# Patient Record
Sex: MALE | Race: WHITE | NOT HISPANIC OR LATINO | Employment: FULL TIME | URBAN - METROPOLITAN AREA
[De-identification: names, ages, dates, MRNs, and addresses within clinical notes are randomized per-mention and may not be internally consistent; named-entity substitution may affect disease eponyms.]

---

## 2017-01-16 ENCOUNTER — NON-PROVIDER VISIT (OUTPATIENT)
Dept: OCCUPATIONAL MEDICINE | Facility: CLINIC | Age: 37
End: 2017-01-16

## 2017-01-16 DIAGNOSIS — Z02.1 PRE-EMPLOYMENT DRUG SCREENING: ICD-10-CM

## 2017-01-16 LAB
AMP AMPHETAMINE: NORMAL
BAR BARBITURATES: NORMAL
BZO BENZODIAZEPINES: NORMAL
COC COCAINE: NORMAL
INT CON NEG: NORMAL
INT CON POS: NORMAL
MDMA ECSTASY: NORMAL
MET METHAMPHETAMINES: NORMAL
MTD METHADONE: NORMAL
OPI OPIATES: NORMAL
OXY OXYCODONE: NORMAL
PCP PHENCYCLIDINE: NORMAL
POC URINE DRUG SCREEN OCDRS: NEGATIVE
THC: NORMAL

## 2017-01-16 PROCEDURE — 80305 DRUG TEST PRSMV DIR OPT OBS: CPT | Performed by: PREVENTIVE MEDICINE

## 2018-04-24 ENCOUNTER — OFFICE VISIT (OUTPATIENT)
Dept: URGENT CARE | Facility: CLINIC | Age: 38
End: 2018-04-24
Payer: COMMERCIAL

## 2018-04-24 VITALS
HEART RATE: 99 BPM | HEIGHT: 71 IN | DIASTOLIC BLOOD PRESSURE: 104 MMHG | TEMPERATURE: 98.3 F | WEIGHT: 214.8 LBS | BODY MASS INDEX: 30.07 KG/M2 | SYSTOLIC BLOOD PRESSURE: 158 MMHG | OXYGEN SATURATION: 95 % | RESPIRATION RATE: 16 BRPM

## 2018-04-24 DIAGNOSIS — J20.9 ACUTE BRONCHITIS, UNSPECIFIED ORGANISM: ICD-10-CM

## 2018-04-24 PROCEDURE — 99204 OFFICE O/P NEW MOD 45 MIN: CPT | Performed by: PHYSICIAN ASSISTANT

## 2018-04-24 RX ORDER — ALBUTEROL SULFATE 90 UG/1
2 AEROSOL, METERED RESPIRATORY (INHALATION) EVERY 6 HOURS PRN
Qty: 8.5 G | Refills: 0 | Status: SHIPPED | OUTPATIENT
Start: 2018-04-24

## 2018-04-24 RX ORDER — METHYLPREDNISOLONE 4 MG/1
TABLET ORAL
Qty: 21 TAB | Refills: 0 | Status: SHIPPED | OUTPATIENT
Start: 2018-04-24 | End: 2018-07-30

## 2018-04-24 RX ORDER — AZITHROMYCIN 250 MG/1
TABLET, FILM COATED ORAL
Qty: 6 TAB | Refills: 0 | Status: SHIPPED | OUTPATIENT
Start: 2018-04-24 | End: 2018-07-30

## 2018-04-24 NOTE — PROGRESS NOTES
"Chief Complaint   Patient presents with   • Cough     cough/ chest congestion X 3 weeks        HISTORY OF PRESENT ILLNESS: Patient is a 38 y.o. male who presents today for 3 weeks of not improving coughing/chest congestion.   Last night he may have had fevers because he felt cold and was having sweats.  He coughs more in the morning and he coughs so hard he almost throws up and brings up some mucus.  Today that mucus was discolored.  No blood.    The cough is otherwise dry.  He is having a near constant runny nose but he attributes this to dust allergies.   He does feel some wheezing/tight chested intermittently.  No chest pain or pain with breathing.   He does smoke 1/2 PPD.      There are no active problems to display for this patient.      Allergies:Patient has no known allergies.    No current [facility]-ordered outpatient prescriptions on file.     No current [facility]-ordered facility-administered medications on file.        History reviewed. No pertinent past medical history.    Social History   Substance Use Topics   • Smoking status: Current Every Day Smoker     Packs/day: 0.50     Types: Cigarettes   • Smokeless tobacco: Never Used   • Alcohol use Not on file       No family status information on file.   History reviewed. No pertinent family history.    ROS:  Review of Systems   Constitutional: SEE HPI  HENT: SEE HPI  Eyes: Negative for blurred vision.   Respiratory: SEE HPI   Cardiovascular: Negative for chest pain, palpitations, orthopnea and leg swelling.   Gastrointestinal: Negative for heartburn, nausea, vomiting and abdominal pain.   All other systems reviewed and are negative.       Exam:  Blood pressure 158/104, pulse 99, temperature 36.8 °C (98.3 °F), resp. rate 16, height 1.803 m (5' 11\"), weight 97.4 kg (214 lb 12.8 oz), SpO2 95 %.  General:  Well nourished, well developed male in NAD  Eyes: PERRLA, EOM within normal limits, no conjunctival injection, no scleral icterus, visual fields and acuity grossly " intact.  Ears: Normal shape and symmetry, no tenderness, no discharge. External canals are without any significant edema or erythema. Tympanic membranes are without any inflammation, no effusion. Gross auditory acuity is intact  Nose: Symmetrical, sinuses without tenderness, no discharge.   Mouth: reasonable hygiene, no erythema exudates or tonsillar enlargement.  Neck: no masses, range of motion within normal limits, no tracheal deviation. No lymphadenopathy  Pulmonary: Normal respiratory effort, end exp wheezes scattered bilaterally without crackles or rhonchi.   Cardiovascular: regular rate and rhythm without murmurs, rubs, or gallops.  Skin: No visible rashes or lesion. Warm, pink, dry.   Extremities: no clubbing, cyanosis, or edema.  Neuro: A&O x 3. Speech normal/clear.  Normal gait.       Assessment/Plan:  1. Acute bronchitis, unspecified organism  MethylPREDNISolone (MEDROL DOSEPAK) 4 MG Tablet Therapy Pack    azithromycin (ZITHROMAX) 250 MG Tab    albuterol 108 (90 Base) MCG/ACT Aero Soln inhalation aerosol         -albuterol HFA, additional rx as above.   -humidifier/steamy showers recommended for lung soothing.  Rest and fluids emphasized.   -smoking cessation emphasized.        Supportive care, differential diagnoses, and indications for immediate follow-up discussed with patient.   Pathogenesis of diagnosis discussed including typical length and natural progression.   Instructed to return to clinic or nearest emergency department for any change in condition, further concerns, or worsening of symptoms.  Patient states understanding of the plan of care and discharge instructions.      Lay Cardoso P.A.-C.

## 2018-07-30 ENCOUNTER — APPOINTMENT (OUTPATIENT)
Dept: RADIOLOGY | Facility: MEDICAL CENTER | Age: 38
End: 2018-07-30
Attending: INTERNAL MEDICINE
Payer: COMMERCIAL

## 2018-07-30 ENCOUNTER — HOSPITAL ENCOUNTER (OUTPATIENT)
Facility: MEDICAL CENTER | Age: 38
End: 2018-07-30
Attending: EMERGENCY MEDICINE | Admitting: INTERNAL MEDICINE
Payer: COMMERCIAL

## 2018-07-30 ENCOUNTER — APPOINTMENT (OUTPATIENT)
Dept: RADIOLOGY | Facility: MEDICAL CENTER | Age: 38
End: 2018-07-30
Attending: EMERGENCY MEDICINE
Payer: COMMERCIAL

## 2018-07-30 ENCOUNTER — PATIENT OUTREACH (OUTPATIENT)
Dept: HEALTH INFORMATION MANAGEMENT | Facility: OTHER | Age: 38
End: 2018-07-30

## 2018-07-30 VITALS
OXYGEN SATURATION: 96 % | BODY MASS INDEX: 30.17 KG/M2 | HEIGHT: 70 IN | HEART RATE: 80 BPM | DIASTOLIC BLOOD PRESSURE: 84 MMHG | WEIGHT: 210.76 LBS | TEMPERATURE: 98.5 F | RESPIRATION RATE: 16 BRPM | SYSTOLIC BLOOD PRESSURE: 153 MMHG

## 2018-07-30 DIAGNOSIS — Z82.49 FAMILY HX OF ISCHEM HEART DIS AND OTH DIS OF THE CIRC SYS: ICD-10-CM

## 2018-07-30 DIAGNOSIS — R07.9 CHEST PAIN AT REST: ICD-10-CM

## 2018-07-30 DIAGNOSIS — Z72.0 TOBACCO ABUSE: ICD-10-CM

## 2018-07-30 PROBLEM — F10.10 ALCOHOL ABUSE: Status: ACTIVE | Noted: 2018-07-30

## 2018-07-30 PROBLEM — R55 SYNCOPE: Status: RESOLVED | Noted: 2018-07-30 | Resolved: 2018-07-30

## 2018-07-30 PROBLEM — R55 SYNCOPE: Status: ACTIVE | Noted: 2018-07-30

## 2018-07-30 PROBLEM — E87.6 HYPOKALEMIA: Status: RESOLVED | Noted: 2018-07-30 | Resolved: 2018-07-30

## 2018-07-30 PROBLEM — E87.6 HYPOKALEMIA: Status: ACTIVE | Noted: 2018-07-30

## 2018-07-30 LAB
ALBUMIN SERPL BCP-MCNC: 4.3 G/DL (ref 3.2–4.9)
ALBUMIN/GLOB SERPL: 1.4 G/DL
ALP SERPL-CCNC: 67 U/L (ref 30–99)
ALT SERPL-CCNC: 93 U/L (ref 2–50)
ANION GAP SERPL CALC-SCNC: 11 MMOL/L (ref 0–11.9)
APTT PPP: 25.1 SEC (ref 24.7–36)
AST SERPL-CCNC: 62 U/L (ref 12–45)
BASOPHILS # BLD AUTO: 0.5 % (ref 0–1.8)
BASOPHILS # BLD: 0.04 K/UL (ref 0–0.12)
BILIRUB SERPL-MCNC: 0.5 MG/DL (ref 0.1–1.5)
BUN SERPL-MCNC: 5 MG/DL (ref 8–22)
CALCIUM SERPL-MCNC: 8.7 MG/DL (ref 8.4–10.2)
CHLORIDE SERPL-SCNC: 102 MMOL/L (ref 96–112)
CO2 SERPL-SCNC: 24 MMOL/L (ref 20–33)
CREAT SERPL-MCNC: 0.88 MG/DL (ref 0.5–1.4)
EKG IMPRESSION: NORMAL
EOSINOPHIL # BLD AUTO: 0.07 K/UL (ref 0–0.51)
EOSINOPHIL NFR BLD: 1 % (ref 0–6.9)
ERYTHROCYTE [DISTWIDTH] IN BLOOD BY AUTOMATED COUNT: 44.1 FL (ref 35.9–50)
ETHANOL BLD-MCNC: 0.27 G/DL
GLOBULIN SER CALC-MCNC: 3 G/DL (ref 1.9–3.5)
GLUCOSE SERPL-MCNC: 205 MG/DL (ref 65–99)
HCT VFR BLD AUTO: 48.3 % (ref 42–52)
HGB BLD-MCNC: 16.9 G/DL (ref 14–18)
IMM GRANULOCYTES # BLD AUTO: 0.03 K/UL (ref 0–0.11)
IMM GRANULOCYTES NFR BLD AUTO: 0.4 % (ref 0–0.9)
INR PPP: 0.95 (ref 0.87–1.13)
LV EJECT FRACT  99904: 75
LV EJECT FRACT MOD 2C 99903: 71.43
LV EJECT FRACT MOD 4C 99902: 68.62
LV EJECT FRACT MOD BP 99901: 69.71
LYMPHOCYTES # BLD AUTO: 1.98 K/UL (ref 1–4.8)
LYMPHOCYTES NFR BLD: 27 % (ref 22–41)
MCH RBC QN AUTO: 34.5 PG (ref 27–33)
MCHC RBC AUTO-ENTMCNC: 35 G/DL (ref 33.7–35.3)
MCV RBC AUTO: 98.6 FL (ref 81.4–97.8)
MONOCYTES # BLD AUTO: 0.71 K/UL (ref 0–0.85)
MONOCYTES NFR BLD AUTO: 9.7 % (ref 0–13.4)
NEUTROPHILS # BLD AUTO: 4.5 K/UL (ref 1.82–7.42)
NEUTROPHILS NFR BLD: 61.4 % (ref 44–72)
NRBC # BLD AUTO: 0 K/UL
NRBC BLD-RTO: 0 /100 WBC
PLATELET # BLD AUTO: 219 K/UL (ref 164–446)
PMV BLD AUTO: 8.9 FL (ref 9–12.9)
POTASSIUM SERPL-SCNC: 3.2 MMOL/L (ref 3.6–5.5)
PROT SERPL-MCNC: 7.3 G/DL (ref 6–8.2)
PROTHROMBIN TIME: 12.6 SEC (ref 12–14.6)
RBC # BLD AUTO: 4.9 M/UL (ref 4.7–6.1)
SODIUM SERPL-SCNC: 137 MMOL/L (ref 135–145)
T4 FREE SERPL-MCNC: 0.89 NG/DL (ref 0.58–1.64)
TROPONIN I SERPL-MCNC: <0.02 NG/ML (ref 0–0.04)
TROPONIN I SERPL-MCNC: <0.02 NG/ML (ref 0–0.04)
TSH SERPL DL<=0.005 MIU/L-ACNC: 1.69 UIU/ML (ref 0.38–5.33)
WBC # BLD AUTO: 7.3 K/UL (ref 4.8–10.8)

## 2018-07-30 PROCEDURE — 80307 DRUG TEST PRSMV CHEM ANLYZR: CPT

## 2018-07-30 PROCEDURE — 94760 N-INVAS EAR/PLS OXIMETRY 1: CPT

## 2018-07-30 PROCEDURE — 85730 THROMBOPLASTIN TIME PARTIAL: CPT

## 2018-07-30 PROCEDURE — A9502 TC99M TETROFOSMIN: HCPCS

## 2018-07-30 PROCEDURE — 80053 COMPREHEN METABOLIC PANEL: CPT

## 2018-07-30 PROCEDURE — 700101 HCHG RX REV CODE 250: Performed by: EMERGENCY MEDICINE

## 2018-07-30 PROCEDURE — 700101 HCHG RX REV CODE 250: Performed by: INTERNAL MEDICINE

## 2018-07-30 PROCEDURE — 99235 HOSP IP/OBS SAME DATE MOD 70: CPT | Performed by: INTERNAL MEDICINE

## 2018-07-30 PROCEDURE — G0378 HOSPITAL OBSERVATION PER HR: HCPCS

## 2018-07-30 PROCEDURE — A9270 NON-COVERED ITEM OR SERVICE: HCPCS | Performed by: EMERGENCY MEDICINE

## 2018-07-30 PROCEDURE — 700102 HCHG RX REV CODE 250 W/ 637 OVERRIDE(OP)

## 2018-07-30 PROCEDURE — 93306 TTE W/DOPPLER COMPLETE: CPT

## 2018-07-30 PROCEDURE — 93005 ELECTROCARDIOGRAM TRACING: CPT | Performed by: EMERGENCY MEDICINE

## 2018-07-30 PROCEDURE — 85610 PROTHROMBIN TIME: CPT

## 2018-07-30 PROCEDURE — 99285 EMERGENCY DEPT VISIT HI MDM: CPT

## 2018-07-30 PROCEDURE — 700111 HCHG RX REV CODE 636 W/ 250 OVERRIDE (IP)

## 2018-07-30 PROCEDURE — 36415 COLL VENOUS BLD VENIPUNCTURE: CPT

## 2018-07-30 PROCEDURE — 84439 ASSAY OF FREE THYROXINE: CPT

## 2018-07-30 PROCEDURE — 85025 COMPLETE CBC W/AUTO DIFF WBC: CPT

## 2018-07-30 PROCEDURE — 84484 ASSAY OF TROPONIN QUANT: CPT

## 2018-07-30 PROCEDURE — 71045 X-RAY EXAM CHEST 1 VIEW: CPT

## 2018-07-30 PROCEDURE — 700102 HCHG RX REV CODE 250 W/ 637 OVERRIDE(OP): Performed by: EMERGENCY MEDICINE

## 2018-07-30 PROCEDURE — A9270 NON-COVERED ITEM OR SERVICE: HCPCS

## 2018-07-30 PROCEDURE — 84443 ASSAY THYROID STIM HORMONE: CPT

## 2018-07-30 PROCEDURE — 96374 THER/PROPH/DIAG INJ IV PUSH: CPT

## 2018-07-30 PROCEDURE — 93308 TTE F-UP OR LMTD: CPT | Mod: 26 | Performed by: INTERNAL MEDICINE

## 2018-07-30 RX ORDER — BISACODYL 10 MG
10 SUPPOSITORY, RECTAL RECTAL
Status: DISCONTINUED | OUTPATIENT
Start: 2018-07-30 | End: 2018-07-30 | Stop reason: HOSPADM

## 2018-07-30 RX ORDER — REGADENOSON 0.08 MG/ML
INJECTION, SOLUTION INTRAVENOUS
Status: COMPLETED
Start: 2018-07-30 | End: 2018-07-30

## 2018-07-30 RX ORDER — LABETALOL HYDROCHLORIDE 5 MG/ML
10 INJECTION, SOLUTION INTRAVENOUS EVERY 4 HOURS PRN
Status: DISCONTINUED | OUTPATIENT
Start: 2018-07-30 | End: 2018-07-30 | Stop reason: HOSPADM

## 2018-07-30 RX ORDER — ACETAMINOPHEN 325 MG/1
650 TABLET ORAL EVERY 6 HOURS PRN
Status: DISCONTINUED | OUTPATIENT
Start: 2018-07-30 | End: 2018-07-30 | Stop reason: HOSPADM

## 2018-07-30 RX ORDER — ASPIRIN 600 MG/1
300 SUPPOSITORY RECTAL ONCE
Status: COMPLETED | OUTPATIENT
Start: 2018-07-30 | End: 2018-07-30

## 2018-07-30 RX ORDER — ASPIRIN 81 MG/1
324 TABLET, CHEWABLE ORAL DAILY
Status: DISCONTINUED | OUTPATIENT
Start: 2018-07-31 | End: 2018-07-30 | Stop reason: HOSPADM

## 2018-07-30 RX ORDER — ONDANSETRON 2 MG/ML
4 INJECTION INTRAMUSCULAR; INTRAVENOUS EVERY 4 HOURS PRN
Status: DISCONTINUED | OUTPATIENT
Start: 2018-07-30 | End: 2018-07-30 | Stop reason: HOSPADM

## 2018-07-30 RX ORDER — AMOXICILLIN 250 MG
2 CAPSULE ORAL 2 TIMES DAILY
Status: DISCONTINUED | OUTPATIENT
Start: 2018-07-30 | End: 2018-07-30 | Stop reason: HOSPADM

## 2018-07-30 RX ORDER — NITROGLYCERIN 0.4 MG/1
0.4 TABLET SUBLINGUAL
Status: DISCONTINUED | OUTPATIENT
Start: 2018-07-30 | End: 2018-07-30 | Stop reason: HOSPADM

## 2018-07-30 RX ORDER — SODIUM CHLORIDE AND POTASSIUM CHLORIDE 150; 900 MG/100ML; MG/100ML
INJECTION, SOLUTION INTRAVENOUS CONTINUOUS
Status: DISCONTINUED | OUTPATIENT
Start: 2018-07-30 | End: 2018-07-30 | Stop reason: HOSPADM

## 2018-07-30 RX ORDER — ASPIRIN 81 MG/1
324 TABLET, CHEWABLE ORAL ONCE
Status: COMPLETED | OUTPATIENT
Start: 2018-07-30 | End: 2018-07-30

## 2018-07-30 RX ORDER — ASPIRIN 600 MG/1
300 SUPPOSITORY RECTAL DAILY
Status: DISCONTINUED | OUTPATIENT
Start: 2018-07-31 | End: 2018-07-30 | Stop reason: HOSPADM

## 2018-07-30 RX ORDER — POLYETHYLENE GLYCOL 3350 17 G/17G
1 POWDER, FOR SOLUTION ORAL
Status: DISCONTINUED | OUTPATIENT
Start: 2018-07-30 | End: 2018-07-30 | Stop reason: HOSPADM

## 2018-07-30 RX ORDER — ASPIRIN 81 MG/1
TABLET, CHEWABLE ORAL
Status: COMPLETED
Start: 2018-07-30 | End: 2018-07-30

## 2018-07-30 RX ORDER — NITROGLYCERIN 0.4 MG/1
TABLET SUBLINGUAL
Status: COMPLETED
Start: 2018-07-30 | End: 2018-07-30

## 2018-07-30 RX ORDER — ASPIRIN 325 MG
325 TABLET ORAL DAILY
Status: DISCONTINUED | OUTPATIENT
Start: 2018-07-31 | End: 2018-07-30 | Stop reason: HOSPADM

## 2018-07-30 RX ORDER — PROMETHAZINE HYDROCHLORIDE 25 MG/1
12.5-25 TABLET ORAL EVERY 4 HOURS PRN
Status: DISCONTINUED | OUTPATIENT
Start: 2018-07-30 | End: 2018-07-30 | Stop reason: HOSPADM

## 2018-07-30 RX ORDER — PROMETHAZINE HYDROCHLORIDE 25 MG/1
12.5-25 SUPPOSITORY RECTAL EVERY 4 HOURS PRN
Status: DISCONTINUED | OUTPATIENT
Start: 2018-07-30 | End: 2018-07-30 | Stop reason: HOSPADM

## 2018-07-30 RX ORDER — SODIUM CHLORIDE 9 MG/ML
INJECTION, SOLUTION INTRAVENOUS CONTINUOUS
Status: DISCONTINUED | OUTPATIENT
Start: 2018-07-30 | End: 2018-07-30

## 2018-07-30 RX ORDER — ONDANSETRON 4 MG/1
4 TABLET, ORALLY DISINTEGRATING ORAL EVERY 4 HOURS PRN
Status: DISCONTINUED | OUTPATIENT
Start: 2018-07-30 | End: 2018-07-30 | Stop reason: HOSPADM

## 2018-07-30 RX ORDER — METOPROLOL TARTRATE 1 MG/ML
5 INJECTION, SOLUTION INTRAVENOUS ONCE
Status: COMPLETED | OUTPATIENT
Start: 2018-07-30 | End: 2018-07-30

## 2018-07-30 RX ORDER — ACETAMINOPHEN 325 MG/1
650 TABLET ORAL ONCE
Status: COMPLETED | OUTPATIENT
Start: 2018-07-30 | End: 2018-07-30

## 2018-07-30 RX ADMIN — NITROGLYCERIN 1 INCH: 20 OINTMENT TOPICAL at 06:35

## 2018-07-30 RX ADMIN — NITROGLYCERIN 0.4 MG: 0.4 TABLET SUBLINGUAL at 06:18

## 2018-07-30 RX ADMIN — LIDOCAINE HYDROCHLORIDE 30 ML: 20 SOLUTION OROPHARYNGEAL at 06:34

## 2018-07-30 RX ADMIN — POTASSIUM CHLORIDE AND SODIUM CHLORIDE: 900; 150 INJECTION, SOLUTION INTRAVENOUS at 10:20

## 2018-07-30 RX ADMIN — ACETAMINOPHEN 650 MG: 325 TABLET, FILM COATED ORAL at 06:35

## 2018-07-30 RX ADMIN — ASPIRIN 81 MG 324 MG: 81 TABLET ORAL at 06:19

## 2018-07-30 RX ADMIN — METOPROLOL TARTRATE 5 MG: 5 INJECTION INTRAVENOUS at 06:25

## 2018-07-30 RX ADMIN — ASPIRIN 324 MG: 81 TABLET, CHEWABLE ORAL at 06:19

## 2018-07-30 RX ADMIN — REGADENOSON 0.4 MG: 0.08 INJECTION, SOLUTION INTRAVENOUS at 15:49

## 2018-07-30 ASSESSMENT — COGNITIVE AND FUNCTIONAL STATUS - GENERAL
SUGGESTED CMS G CODE MODIFIER DAILY ACTIVITY: CH
MOBILITY SCORE: 24
SUGGESTED CMS G CODE MODIFIER MOBILITY: CH
DAILY ACTIVITIY SCORE: 24

## 2018-07-30 ASSESSMENT — ENCOUNTER SYMPTOMS
FALLS: 0
FEVER: 0
WEAKNESS: 0
SPUTUM PRODUCTION: 0
SHORTNESS OF BREATH: 0
LOSS OF CONSCIOUSNESS: 1
ABDOMINAL PAIN: 0
DIARRHEA: 0
MYALGIAS: 0
DIZZINESS: 0
DEPRESSION: 0
COUGH: 0
TINGLING: 0
HEADACHES: 0
CHILLS: 0
PALPITATIONS: 0
CONSTIPATION: 0
NAUSEA: 1
VOMITING: 0
STRIDOR: 0

## 2018-07-30 ASSESSMENT — LIFESTYLE VARIABLES
CONSUMPTION TOTAL: POSITIVE
HOW MANY TIMES IN THE PAST YEAR HAVE YOU HAD 5 OR MORE DRINKS IN A DAY: 20
TOTAL SCORE: 1
EVER_SMOKED: YES
EVER FELT BAD OR GUILTY ABOUT YOUR DRINKING: NO
TOTAL SCORE: 1
ALCOHOL_USE: YES
HAVE PEOPLE ANNOYED YOU BY CRITICIZING YOUR DRINKING: NO
EVER HAD A DRINK FIRST THING IN THE MORNING TO STEADY YOUR NERVES TO GET RID OF A HANGOVER: NO
HAVE YOU EVER FELT YOU SHOULD CUT DOWN ON YOUR DRINKING: YES
AVERAGE NUMBER OF DAYS PER WEEK YOU HAVE A DRINK CONTAINING ALCOHOL: 5
TOTAL SCORE: 1
ON A TYPICAL DAY WHEN YOU DRINK ALCOHOL HOW MANY DRINKS DO YOU HAVE: 3

## 2018-07-30 ASSESSMENT — COPD QUESTIONNAIRES
IN THE PAST 12 MONTHS DO YOU DO LESS THAN YOU USED TO BECAUSE OF YOUR BREATHING PROBLEMS: AGREE
DO YOU EVER COUGH UP ANY MUCUS OR PHLEGM?: YES, A FEW DAYS A WEEK OR MONTH
DURING THE PAST 4 WEEKS HOW MUCH DID YOU FEEL SHORT OF BREATH: MOST  OR ALL OF THE TIME
HAVE YOU SMOKED AT LEAST 100 CIGARETTES IN YOUR ENTIRE LIFE: YES
COPD SCREENING SCORE: 6

## 2018-07-30 ASSESSMENT — PATIENT HEALTH QUESTIONNAIRE - PHQ9
2. FEELING DOWN, DEPRESSED, IRRITABLE, OR HOPELESS: NOT AT ALL
1. LITTLE INTEREST OR PLEASURE IN DOING THINGS: NOT AT ALL
SUM OF ALL RESPONSES TO PHQ9 QUESTIONS 1 AND 2: 0
2. FEELING DOWN, DEPRESSED, IRRITABLE, OR HOPELESS: NOT AT ALL
SUM OF ALL RESPONSES TO PHQ9 QUESTIONS 1 AND 2: 0
1. LITTLE INTEREST OR PLEASURE IN DOING THINGS: NOT AT ALL

## 2018-07-30 ASSESSMENT — PAIN SCALES - GENERAL
PAINLEVEL_OUTOF10: 9
PAINLEVEL_OUTOF10: 5
PAINLEVEL_OUTOF10: 3
PAINLEVEL_OUTOF10: 2
PAINLEVEL_OUTOF10: 0
PAINLEVEL_OUTOF10: 4

## 2018-07-30 NOTE — PROGRESS NOTES
"Initial assessments done, pt is A&O x 4, no complaints of pain, pt states \"medicine given earlier was affective.\", no signs of distress, education given on up coming procedure, will continue to monitor.   "

## 2018-07-30 NOTE — ASSESSMENT & PLAN NOTE
-Obtain echocardiogram and monitor on telemetry  -This portion of his history is a bit clouded, possibly not true syncope however will obtain the echocardiogram to monitor on telemetry, if stress test is negative and no sign of changes on echo or telemetry will so discharge him later today and not monitor him overnight  -No mediastinal widening on chest x-ray, no shearing or tearing pain, at this point in time I do not feel there is been a aortic dissection, do not feel CT aorta is warranted

## 2018-07-30 NOTE — ED PROVIDER NOTES
"ED Provider Note    CHIEF COMPLAINT  Chief Complaint   Patient presents with   • Chest Pain     \"Heaviness\" left side. Onset last night. Non-radiating.   • N/V     Vomited x2.  Reports drinking yesterday during day and evening.       HPI  Leonel Villalobos is a 38 y.o. male who presents with report of chest heaviness that began last night around 7 PM and lasted all night worsening at times.  He reports drinking alcohol yesterday during the day and into the evening and does drink daily.  He vomited a couple times with there is no evidence of coffee-ground emesis.  He is concerned about his heart and sees his primary every couple of years for stress test.  He continues to smoke a pack a day.  His father  of a heart attack at 43 years old.  He denies leg pain, swelling, cough, sweats, fever.  Says the pain is dull, pressure, moderate to severe, nonradiating, no alleviating factors    REVIEW OF SYSTEMS  See HPI for further details. All other systems are negative.     PAST MEDICAL HISTORY  Past Medical History:   Diagnosis Date   • Asthma        FAMILY HISTORY  History reviewed. No pertinent family history.    SOCIAL HISTORY   reports that he has been smoking Cigarettes.  He has been smoking about 1.00 pack per day. He has never used smokeless tobacco. He reports that he drinks alcohol. He reports that he does not use drugs.    SURGICAL HISTORY  History reviewed. No pertinent surgical history.    CURRENT MEDICATIONS  Home Medications     Reviewed by Jackie Jacobsen R.N. (Registered Nurse) on 18 at 0609  Med List Status: Complete   Medication Last Dose Status   albuterol 108 (90 Base) MCG/ACT Aero Soln inhalation aerosol 2018 Active                ALLERGIES  No Known Allergies    PHYSICAL EXAM  VITAL SIGNS: /68   Pulse 99   Temp 36.6 °C (97.8 °F)   Resp 16   Ht 1.778 m (5' 10\")   Wt 95.9 kg (211 lb 6.7 oz)   SpO2 93%   BMI 30.34 kg/m²    Constitutional: Well developed, Well nourished, No acute " distress, Non-toxic appearance.   HENT: Normocephalic, Atraumatic, Bilateral external ears normal, Oropharynx is clear mucous membranes are moist. No oral exudates or nasal discharge.   Eyes: Pupils are equal round and reactive, EOMI, Conjunctiva normal, No discharge.   Neck: Normal range of motion, No tenderness, Supple, No stridor. No meningismus.  Lymphatic: No lymphadenopathy noted.   Cardiovascular: Regular rate and rhythm without murmur rub or gallop.  Thorax & Lungs: Clear breath sounds bilaterally without wheezes, rhonchi or rales. There is no chest wall tenderness.   Abdomen: Soft non-tender non-distended. There is no rebound or guarding. No organomegaly is appreciated. Bowel sounds are normal.  Skin: Normal without rash.   Back: No CVA or spinal tenderness.   Extremities: Intact distal pulses, No edema, No tenderness, No cyanosis, No clubbing. Capillary refill is less than 2 seconds.  Musculoskeletal: Good range of motion in all major joints. No tenderness to palpation or major deformities noted.   Neurologic: Alert & oriented x 3, Normal motor function, Normal sensory function, No focal deficits noted. Reflexes are normal.  Psychiatric: Affect normal, Judgment normal, Mood normal. There is no suicidal ideation or patient reported hallucinations.     EKG  EKG Interpretation    Interpreted by emergency department physician    Rhythm: normal sinus   Rate: normal  Axis: normal  Ectopy: none  Conduction: normal  ST Segments: no acute change  T Waves: no acute change  Q Waves: nonspecific    Clinical Impression: non-specific EKG        RADIOLOGY/PROCEDURES  DX-CHEST-PORTABLE (1 VIEW)   Final Result      No acute cardiac or pulmonary abnormalities are identified.            COURSE & MEDICAL DECISION MAKING  Pertinent Labs & Imaging studies reviewed. (See chart for details)  I administered aspirin 4 tablets to chew as well as one nitroglycerin tablet initially for control of his pressure and pain.  Subsequently  ordered 5 mg of Lopressor to reduce his myocardial oxygen demand.    EKG was reviewed and shows a rate of 97 without evidence of ischemic changes although he does appear to have inverted T waves in the inferior leads that are subtle.  There is no ST depression or dysrhythmia    Laboratory evaluation reveals no evidence of leukocytosis, shift, anemia.  There is low potassium at 3.2 but this will be corrected on the floor.  Slightly elevated glucose at 205.  He is not a known diabetic.  Slight elevation of liver enzymes likely secondary to regular drinking of 4-5 beers a day.    I spoke with Dr. Rowley as I feel the patient would benefit from admission and Persantine thallium stress test as he is moderately high risk for coronary disease given his family history.    On reevaluation approximately 7:15 AM the patient is feeling much better and pain-free with improved vital signs.  He understands this plan of care and is agreeable is admitted in stable condition to telemetry floor    FINAL IMPRESSION  1. Chest pain at rest    2. Tobacco abuse    3. Family hx of ischem heart dis and oth dis of the circ sys             Electronically signed by: Ray Montemayor, 7/30/2018 6:26 AM

## 2018-07-30 NOTE — H&P
[facility] Medicine History & Physical Note    Date of Service  2018    Primary Care Physician  Pcp Pt States None    Consultants  None    Code Status  Full    Chief Complaint  Chest pain    History of Presenting Illness  38 y.o. male who presented 2018 with chest pain.  Patient states it initially started a week ago, associated with nausea.  Patient states is located on the left chest with radiation to the right, feels like 100 pound weight is on his chest.  Patient states it became severe last night, lasting all night.  Patient did not notice anything that made it better or worse.  Patient did drive to work however in the parking lot he had a syncopal episode so decided to come to the emergency department.  Yesterday whenever it started he was paddle boarding.  Patient's does have a significant family history of myocardial infarction, father  at the age of 43 from a myocardial infarction so the patient gets treadmill stresses about every 2 years.  Patient has never had stress test other than treadmill.    Review of Systems  Review of Systems   Constitutional: Negative for chills, fever and malaise/fatigue.   HENT: Negative for congestion.    Respiratory: Negative for cough, sputum production, shortness of breath and stridor.    Cardiovascular: Positive for chest pain. Negative for palpitations and leg swelling.   Gastrointestinal: Positive for nausea. Negative for abdominal pain, constipation, diarrhea and vomiting.   Genitourinary: Negative for dysuria and urgency.   Musculoskeletal: Negative for falls and myalgias.   Neurological: Positive for loss of consciousness. Negative for dizziness, tingling, weakness and headaches.   Psychiatric/Behavioral: Negative for depression and suicidal ideas.   All other systems reviewed and are negative.      Past Medical History   has a past medical history of Asthma.    Surgical History  None    Family History  Myocardial infarction in his father at the age of  43    Social History   reports that he has been smoking Cigarettes.  He has been smoking about 1.00 pack per day. He has never used smokeless tobacco. He reports that he drinks alcohol daily, approximately 3-4 beers. He reports that he does not use drugs.    Allergies  No Known Allergies    Medications  Prior to Admission Medications   Prescriptions Last Dose Informant Patient Reported? Taking?   albuterol 108 (90 Base) MCG/ACT Aero Soln inhalation aerosol 7/29/2018 at 1300 Patient No No   Sig: Inhale 2 Puffs by mouth every 6 hours as needed for Shortness of Breath.      Facility-Administered Medications: None       Physical Exam  Blood Pressure: 121/56   Temperature: 36.6 °C (97.8 °F)   Pulse: 93   Respiration: 20   Pulse Oximetry: 92 %     Physical Exam   Constitutional: He is oriented to person, place, and time. He appears well-developed.  Non-toxic appearance. No distress.   HENT:   Head: Normocephalic and atraumatic.   Mouth/Throat: Oropharynx is clear and moist. No oropharyngeal exudate.   Eyes: Right eye exhibits no discharge. Left eye exhibits no discharge.   Neck: Neck supple. No tracheal deviation, no edema and no erythema present.   Cardiovascular: Normal rate and regular rhythm.  Exam reveals no gallop and no friction rub.    No murmur heard.  Pulmonary/Chest: Effort normal and breath sounds normal. No stridor. No respiratory distress. He has no wheezes. He has no rales. He exhibits no tenderness.   Abdominal: Soft. Bowel sounds are normal. He exhibits no distension. There is no tenderness.   Musculoskeletal: Normal range of motion. He exhibits no edema or tenderness.   Lymphadenopathy:     He has no cervical adenopathy.   Neurological: He is alert and oriented to person, place, and time. No cranial nerve deficit.   Skin: Skin is warm and dry. No rash noted. He is not diaphoretic. No erythema.   Psychiatric: He has a normal mood and affect. His behavior is normal. Judgment and thought content normal.    Nursing note and vitals reviewed.      Laboratory:  Recent Labs      07/30/18   0610   WBC  7.3   RBC  4.90   HEMOGLOBIN  16.9   HEMATOCRIT  48.3   MCV  98.6*   MCH  34.5*   MCHC  35.0   RDW  44.1   PLATELETCT  219   MPV  8.9*     Recent Labs      07/30/18   0610   SODIUM  137   POTASSIUM  3.2*   CHLORIDE  102   CO2  24   GLUCOSE  205*   BUN  5*   CREATININE  0.88   CALCIUM  8.7     Recent Labs      07/30/18   0610   ALTSGPT  93*   ASTSGOT  62*   ALKPHOSPHAT  67   TBILIRUBIN  0.5   GLUCOSE  205*     Recent Labs      07/30/18   0610   APTT  25.1   INR  0.95             Lab Results   Component Value Date    TROPONINI <0.02 07/30/2018       Urinalysis:    No results found for: SPECGRAVITY, GLUCOSEUR, KETONES, NITRITE, WBCURINE, RBCURINE, BACTERIA, EPITHELCELL     Imaging:  DX-CHEST-PORTABLE (1 VIEW)   Final Result      No acute cardiac or pulmonary abnormalities are identified.      Echocardiogram Comp W/O Cont    (Results Pending)   NM-CARDIAC STRESS TEST    (Results Pending)         Assessment/Plan:  I anticipate this patient is appropriate for observation status at this time.    Chest pain   Assessment & Plan    -Patient has significant risk factors of tobacco and family history  -Trend troponins, if they remain negative then there is no abnormalities noted on EKG or telemetry, obtain stress test  -Patient has not eaten today so stress test could be obtained this morning        Syncope   Assessment & Plan    -Obtain echocardiogram and monitor on telemetry  -This portion of his history is a bit clouded, possibly not true syncope however will obtain the echocardiogram to monitor on telemetry, if stress test is negative and no sign of changes on echo or telemetry will so discharge him later today and not monitor him overnight  -No mediastinal widening on chest x-ray, no shearing or tearing pain, at this point in time I do not feel there is been a aortic dissection, do not feel CT aorta is warranted        Hypokalemia    Assessment & Plan    -Place IV potassium and with IV fluids  -Repeat BMP in the morning if patient is still here        Alcohol abuse   Assessment & Plan    -Patient was at [facility] yesterday, smells of alcohol, obtain alcohol level  -Hopefully workup can be finished today if not early tomorrow and therefore not have to worry about any possible withdrawal              VTE prophylaxis: lovenox

## 2018-07-30 NOTE — CARE PLAN
Problem: Safety  Goal: Will remain free from injury  Outcome: PROGRESSING AS EXPECTED  Pt educated on fall risk, pt up self, steady on feet, no assistance required, call light within reach, encouraged to call when needed, safety precautions in place.     Problem: Knowledge Deficit  Goal: Knowledge of disease process/condition, treatment plan, diagnostic tests, and medications will improve  Outcome: PROGRESSING AS EXPECTED  Pt educated on POC and upcoming procedures, allowed time for questions, all questions answered.

## 2018-07-30 NOTE — ASSESSMENT & PLAN NOTE
-Patient was at [facility] yesterday, smells of alcohol, obtain alcohol level  -Hopefully workup can be finished today if not early tomorrow and therefore not have to worry about any possible withdrawal

## 2018-07-30 NOTE — ASSESSMENT & PLAN NOTE
-Patient has significant risk factors of tobacco and family history  -Trend troponins, if they remain negative then there is no abnormalities noted on EKG or telemetry, obtain stress test  -Patient has not eaten today so stress test could be obtained this morning

## 2018-07-30 NOTE — ED NOTES
Report given to Leo FONSECA. Patient resting in bed. Aware of POC. Denies needs or questions at this time. Call light within reach.

## 2018-07-30 NOTE — PROGRESS NOTES
Nursing care plan includes knowledge deficit, potential for discomfort, potential for compromised cardiac output. POC includes teaching, comfort measures and reassurance, and access to code cart, cardiology stand by and availability of rapid response team. Pt verbalizes good understanding of benefits and risks of pharmacological cardiac stress test. Informed consent obtained. Lexiscan given, pt developed the following symptoms SOB and lightheaded. VS stable, major symptoms resolved. To waiting room, caffeinated fluids and/or snacks given. Nursing goals met.

## 2018-07-30 NOTE — ED NOTES
"Leonel Villalobos 38 y.o. male   Ambulatory to room.    Chief Complaint   Patient presents with   • Chest Pain     \"Heaviness\" left side. Onset last night. Non-radiating.   • N/V     Vomited x2.  Reports drinking yesterday during day and evening.      Pr reports driving to work this morning, then didn't fell well and that he \"passed out,\" stating \"I just woke up and it was like 10 minutes later.\"  EKG complete.  PIV placed.  On monitors with alarms audible.  Chart placed for ERP eval.      "

## 2018-07-30 NOTE — ED NOTES
Pt resting on gurney. Pt denies CP at this time. Pt with call light in reach and gurney in low position for pt safety.

## 2018-07-31 NOTE — DISCHARGE INSTRUCTIONS
Discharge Instructions    Discharged to home by car with self. Discharged via walking, hospital escort: Refused.  Special equipment needed: Not Applicable    Be sure to schedule a follow-up appointment with your primary care doctor or any specialists as instructed.     Discharge Plan:   Diet Plan: Discussed  Activity Level: Discussed  Smoking Cessation Offered: Patient Refused  Confirmed Follow up Appointment: Appointment Scheduled  Confirmed Symptoms Management: Discussed  Medication Reconciliation Updated: Yes    I understand that a diet low in cholesterol, fat, and sodium is recommended for good health. Unless I have been given specific instructions below for another diet, I accept this instruction as my diet prescription.   Other diet: Regular    Special Instructions: None    · Is patient discharged on Warfarin / Coumadin?   No     Depression / Suicide Risk    As you are discharged from this [facility] facility, it is important to learn how to keep safe from harming yourself.    Recognize the warning signs:  · Abrupt changes in personality, positive or negative- including increase in energy   · Giving away possessions  · Change in eating patterns- significant weight changes-  positive or negative  · Change in sleeping patterns- unable to sleep or sleeping all the time   · Unwillingness or inability to communicate  · Depression  · Unusual sadness, discouragement and loneliness  · Talk of wanting to die  · Neglect of personal appearance   · Rebelliousness- reckless behavior  · Withdrawal from people/activities they love  · Confusion- inability to concentrate     If you or a loved one observes any of these behaviors or has concerns about self-harm, here's what you can do:  · Talk about it- your feelings and reasons for harming yourself  · Remove any means that you might use to hurt yourself (examples: pills, rope, extension cords, firearm)  · Get professional help from the community (Mental Health, Substance  Abuse, psychological counseling)  · Do not be alone:Call your Safe Contact- someone whom you trust who will be there for you.  · Call your local CRISIS HOTLINE 183-5016 or 308-921-3501  · Call your local Children's Mobile Crisis Response Team [place] (853) 374-3411 or www.[employer] [place]  · Call the toll free National Suicide Prevention Hotlines   · National Suicide Prevention Lifeline 213-571-CZZM (1721)  · [employer] Hope Line Network 800-SUICIDE (321-4386)    Discharge Instructions    Discharged to home by car with self. Discharged via walking, hospital escort: No.  Special equipment needed: Not Applicable    Be sure to schedule a follow-up appointment with your primary care doctor or any specialists as instructed.     Discharge Plan:   Diet Plan: Discussed  Activity Level: Discussed  Smoking Cessation Offered: Patient Refused    I understand that a diet low in cholesterol, fat, and sodium is recommended for good health. Unless I have been given specific instructions below for another diet, I accept this instruction as my diet prescription.   Other diet: Regular    Special Instructions: None    · Is patient discharged on Warfarin / Coumadin?   No     Depression / Suicide Risk    As you are discharged from this [facility] facility, it is important to learn how to keep safe from harming yourself.    Recognize the warning signs:  · Abrupt changes in personality, positive or negative- including increase in energy   · Giving away possessions  · Change in eating patterns- significant weight changes-  positive or negative  · Change in sleeping patterns- unable to sleep or sleeping all the time   · Unwillingness or inability to communicate  · Depression  · Unusual sadness, discouragement and loneliness  · Talk of wanting to die  · Neglect of personal appearance   · Rebelliousness- reckless behavior  · Withdrawal from people/activities they love  · Confusion- inability to concentrate     If you or a loved one  observes any of these behaviors or has concerns about self-harm, here's what you can do:  · Talk about it- your feelings and reasons for harming yourself  · Remove any means that you might use to hurt yourself (examples: pills, rope, extension cords, firearm)  · Get professional help from the community (Mental Health, Substance Abuse, psychological counseling)  · Do not be alone:Call your Safe Contact- someone whom you trust who will be there for you.  · Call your local CRISIS HOTLINE 638-6055 or 657-443-1288  · Call your local Children's Mobile Crisis Response Team [place] (115) 452-1780 or www.[place]  · Call the toll free National Suicide Prevention Hotlines   · National Suicide Prevention Lifeline 994-032-XLOY (0534)  · [employer] Line Network 800-SUICIDE (767-2108)    Albuterol inhalation aerosol  What is this medicine?  ALBUTEROL (al BYOO ter ole) is a bronchodilator. It helps open up the airways in your lungs to make it easier to breathe. This medicine is used to treat and to prevent bronchospasm.  This medicine may be used for other purposes; ask your health care provider or pharmacist if you have questions.  COMMON BRAND NAME(S): Proair HFA, Proventil, Proventil HFA, Respirol, Ventolin, Ventolin HFA  What should I tell my health care provider before I take this medicine?  They need to know if you have any of the following conditions:  -diabetes  -heart disease or irregular heartbeat  -high blood pressure  -pheochromocytoma  -seizures  -thyroid disease  -an unusual or allergic reaction to albuterol, levalbuterol, sulfites, other medicines, foods, dyes, or preservatives  -pregnant or trying to get pregnant  -breast-feeding  How should I use this medicine?  This medicine is for inhalation through the mouth. Follow the directions on your prescription label. Take your medicine at regular intervals. Do not use more often than directed. Make sure that you are using your inhaler correctly. Ask you  doctor or health care provider if you have any questions.  Talk to your pediatrician regarding the use of this medicine in children. Special care may be needed.  Overdosage: If you think you have taken too much of this medicine contact a poison control center or emergency room at once.  NOTE: This medicine is only for you. Do not share this medicine with others.  What if I miss a dose?  If you miss a dose, use it as soon as you can. If it is almost time for your next dose, use only that dose. Do not use double or extra doses.  What may interact with this medicine?  -anti-infectives like chloroquine and pentamidine  -caffeine  -cisapride  -diuretics  -medicines for colds  -medicines for depression or for emotional or psychotic conditions  -medicines for weight loss including some herbal products  -methadone  -some antibiotics like clarithromycin, erythromycin, levofloxacin, and linezolid  -some heart medicines  -steroid hormones like dexamethasone, cortisone, hydrocortisone  -theophylline  -thyroid hormones  This list may not describe all possible interactions. Give your health care provider a list of all the medicines, herbs, non-prescription drugs, or dietary supplements you use. Also tell them if you smoke, drink alcohol, or use illegal drugs. Some items may interact with your medicine.  What should I watch for while using this medicine?  Tell your doctor or health care professional if your symptoms do not improve. Do not use extra albuterol. If your asthma or bronchitis gets worse while you are using this medicine, call your doctor right away.  If your mouth gets dry try chewing sugarless gum or sucking hard candy. Drink water as directed.  What side effects may I notice from receiving this medicine?  Side effects that you should report to your doctor or health care professional as soon as possible:  -allergic reactions like skin rash, itching or hives, swelling of the face, lips, or tongue  -breathing  problems  -chest pain  -feeling faint or lightheaded, falls  -high blood pressure  -irregular heartbeat  -fever  -muscle cramps or weakness  -pain, tingling, numbness in the hands or feet  -vomiting  Side effects that usually do not require medical attention (report to your doctor or health care professional if they continue or are bothersome):  -cough  -difficulty sleeping  -headache  -nervousness or trembling  -stomach upset  -stuffy or runny nose  -throat irritation  -unusual taste  This list may not describe all possible side effects. Call your doctor for medical advice about side effects. You may report side effects to FDA at 4-418-FDA-5468.  Where should I keep my medicine?  Keep out of the reach of children.  Store at room temperature between 15 and 30 degrees C (59 and 86 degrees F). The contents are under pressure and may burst when exposed to heat or flame. Do not freeze. This medicine does not work as well if it is too cold. Throw away any unused medicine after the expiration date. Inhalers need to be thrown away after the labeled number of puffs have been used or by the expiration date; whichever comes first. Ventolin HFA should be thrown away 12 months after removing from foil pouch. Check the instructions that come with your medicine.  NOTE: This sheet is a summary. It may not cover all possible information. If you have questions about this medicine, talk to your doctor, pharmacist, or health care provider.  © 2018 [employer] (2014-06-05 10:57:17)

## 2018-07-31 NOTE — DISCHARGE SUMMARY
"Discharge Summary    CHIEF COMPLAINT ON ADMISSION  Chief Complaint   Patient presents with   • Chest Pain     \"Heaviness\" left side. Onset last night. Non-radiating.   • N/V     Vomited x2.  Reports drinking yesterday during day and evening.       Reason for Admission  Chest Pain     Admission Date  7/30/2018    CODE STATUS  Full Code    HPI & HOSPITAL COURSE  This is a 38 y.o. male here with chest pain and syncope.  Patient states his chest pain initially started a week ago, intermittent in nature.  Patient states yesterday he was at the [employer] and he began having chest pain.  Patient states it located on his left chest with radiation to the right, feels like 100 pound weight is on his chest.  Patient states he continued in severity.  However even with his chest pain he decided to go to work, in the parking lot at work he says he had a syncopal episode so he presented to the emergency department.  Upon arrival to the emergency department patient did smell of alcohol, did have an alcohol level of 0.27.  Patient had a normal echocardiogram which is obtained for the syncopal episode, stress test is negative.  Currently patient is improving, likely nearing his baseline.  Patient did claim to have chest pain and a syncopal episode, this syncopal episode was a bit confusing, states he is out 10-15 minutes, at this point I do not think that a CT to rule out aneurysm/dissection is needed, patient did not have a wide mediastinum, bilateral equal pulses, no shearing type pain.      Therefore, he is discharged in good and stable condition to home with close outpatient follow-up.      Discharge Date  7/30/18    FOLLOW UP ITEMS POST DISCHARGE  Follow-up with primary care provider within 1 week  Emergency department or 911 in case of emergency    DISCHARGE DIAGNOSES  Active Problems:    Alcohol abuse POA: Unknown  Resolved Problems:    Chest pain POA: Unknown    Syncope POA: Unknown    Hypokalemia POA: " Unknown      FOLLOW UP  No future appointments.  No follow-up provider specified.    MEDICATIONS ON DISCHARGE     Medication List      CONTINUE taking these medications      Instructions   albuterol 108 (90 Base) MCG/ACT Aers inhalation aerosol   Inhale 2 Puffs by mouth every 6 hours as needed for Shortness of Breath.  Dose:  2 Puff            Allergies  No Known Allergies    DIET  Orders Placed This Encounter   Procedures   • Diet Order Regular     Standing Status:   Standing     Number of Occurrences:   1     Order Specific Question:   Diet:     Answer:   Regular [1]     Order Specific Question:   Miscellaneous modifications:     Answer:   No Decaf, No Caffeine(for test) [11]     Comments:   Protocol 1313 Patient to have no caffeine for 12 hours prior to exam (decaf, coffee, cola, tea, chocolate)       ACTIVITY  As tolerated.  Weight bearing as tolerated    CONSULTATIONS  None     PROCEDURES  None    LABORATORY  Lab Results   Component Value Date    SODIUM 137 07/30/2018    POTASSIUM 3.2 (L) 07/30/2018    CHLORIDE 102 07/30/2018    CO2 24 07/30/2018    GLUCOSE 205 (H) 07/30/2018    BUN 5 (L) 07/30/2018    CREATININE 0.88 07/30/2018        Lab Results   Component Value Date    WBC 7.3 07/30/2018    HEMOGLOBIN 16.9 07/30/2018    HEMATOCRIT 48.3 07/30/2018    PLATELETCT 219 07/30/2018

## 2018-07-31 NOTE — PROGRESS NOTES
Discharge instructions in place, pt A&O x 4, discharge instructions discussed with pt, pt verbalized understanding, IV/Tele removed, no complaints made by pt, steady on foot, declined wheel chair services, lefts floor by foot with no escort/family to pt's car.

## 2018-07-31 NOTE — PROGRESS NOTES
Tele strip printed at 1006 shows SR.    Measurements from this printed strip:  RI.14  QRS.10  QT.34    Tele Summary:  Rhythm throughout showed SR  Rate was 60-80.  Ectopy through out the shift showed none.    Tele strip placed in the patient's chart.

## 2019-05-15 ENCOUNTER — NON-PROVIDER VISIT (OUTPATIENT)
Dept: OCCUPATIONAL MEDICINE | Facility: CLINIC | Age: 39
End: 2019-05-15

## 2019-05-15 DIAGNOSIS — Z02.1 PRE-EMPLOYMENT DRUG SCREENING: ICD-10-CM

## 2019-05-15 LAB
AMP AMPHETAMINE: NORMAL
COC COCAINE: NORMAL
INT CON NEG: NORMAL
INT CON POS: NORMAL
MET METHAMPHETAMINES: NORMAL
OPI OPIATES: NORMAL
PCP PHENCYCLIDINE: NORMAL
POC DRUG COMMENT 753798-OCCUPATIONAL HEALTH: NEGATIVE
THC: NORMAL

## 2019-05-15 PROCEDURE — 80305 DRUG TEST PRSMV DIR OPT OBS: CPT | Performed by: PREVENTIVE MEDICINE
